# Patient Record
Sex: FEMALE | Race: WHITE | NOT HISPANIC OR LATINO | Employment: OTHER | URBAN - METROPOLITAN AREA
[De-identification: names, ages, dates, MRNs, and addresses within clinical notes are randomized per-mention and may not be internally consistent; named-entity substitution may affect disease eponyms.]

---

## 2020-04-15 RX ORDER — OMEGA-3/DHA/EPA/FISH OIL 300-1000MG
CAPSULE,DELAYED RELEASE (ENTERIC COATED) ORAL DAILY
COMMUNITY

## 2020-04-15 RX ORDER — NAPROXEN SODIUM 220 MG/1
81 TABLET, FILM COATED ORAL DAILY
COMMUNITY

## 2020-04-15 RX ORDER — DOXYCYCLINE 100 MG/1
100 CAPSULE ORAL 2 TIMES DAILY
COMMUNITY

## 2020-04-15 RX ORDER — LEVOTHYROXINE SODIUM 100 UG/1
100 TABLET ORAL
COMMUNITY

## 2020-04-15 RX ORDER — GABAPENTIN 100 MG/1
100 CAPSULE ORAL 3 TIMES DAILY
COMMUNITY
End: 2020-04-22

## 2020-04-15 RX ORDER — BUPROPION HYDROCHLORIDE 150 MG/1
150 TABLET ORAL DAILY
COMMUNITY
End: 2020-04-22

## 2020-04-22 ENCOUNTER — OFFICE VISIT (OUTPATIENT)
Dept: OBSTETRICS AND GYNECOLOGY | Facility: CLINIC | Age: 65
End: 2020-04-22
Payer: COMMERCIAL

## 2020-04-22 DIAGNOSIS — Z78.0 MENOPAUSE: Primary | ICD-10-CM

## 2020-04-22 PROCEDURE — 99213 PR OFFICE/OUTPT VISIT, EST, LEVL III, 20-29 MIN: ICD-10-PCS | Mod: 95,,, | Performed by: OBSTETRICS & GYNECOLOGY

## 2020-04-22 PROCEDURE — 99213 OFFICE O/P EST LOW 20 MIN: CPT | Mod: 95,,, | Performed by: OBSTETRICS & GYNECOLOGY

## 2020-04-22 NOTE — PROGRESS NOTES
Subjective:       Patient ID: Ambrocio Marino is a 64 y.o. female.    Chief Complaint:  Menopause      History of Present Illness  The patient location is: home  The chief complaint leading to consultation is: menopause  Visit type: audiovisual  Total time spent with patient: 10 min  Each patient to whom he or she provides medical services by telemedicine is:  (1) informed of the relationship between the physician and patient and the respective role of any other health care provider with respect to management of the patient; and (2) notified that he or she may decline to receive medical services by telemedicine and may withdraw from such care at any time.    Notes:     Hormone Replacement Counseling  Patient presents to discuss hormone replacement therapy. Patient is requesting hormone replacement therapy due to no energy. The patient is not currently taking hormone replacement therapy. Patient denies post-menopausal vaginal bleeding. The patient is sexually active. GYN screening history: last pap: was normal. Patient is hormone deficient due to hysterectomy with BSO, which occurred at age 51. The patient currently has symptoms of decreased libido. Symptoms in the past had included: no energy.    Current hormone therapy:   none    Previous hormone therapy:   Testosterone  Reason for starting HRT: no energy  Bleeding in past on HRT: none                        GYN & OB History  No LMP recorded.   Date of Last Pap: No result found    OB History    Para Term  AB Living   4 3           SAB TAB Ectopic Multiple Live Births                  # Outcome Date GA Lbr Narinder/2nd Weight Sex Delivery Anes PTL Lv   4             3 Para            2 Para            1 Para                Review of Systems  Review of Systems   Constitutional: Positive for fatigue.   Gastrointestinal: Negative for abdominal pain, bloating, blood in stool, constipation, diarrhea, nausea, vomiting, reflux and fecal incontinence.    Genitourinary: Positive for decreased libido. Negative for bladder incontinence, dysmenorrhea, dyspareunia, dysuria, flank pain, frequency, genital sores, hematuria, hot flashes, menorrhagia, menstrual problem, pelvic pain, urgency, vaginal bleeding, vaginal discharge, vaginal pain, urinary incontinence, postcoital bleeding, postmenopausal bleeding, vaginal dryness and vaginal odor.           Objective:    Physical Exam       Assessment:        1. Menopause       Menopause             Plan:      Follow up in about 1 year (around 4/22/2021).    will start test cream [facility]

## 2020-11-12 NOTE — TELEPHONE ENCOUNTER
Pharmacy faxed refill request on Testosterone Cream.   Faxed approval to pharmacy. Vonda Pappas      Testosterone Cream 3mg/ml  #30  Apply 1ml (4 clicks) topically daily  3 refills

## 2020-12-10 ENCOUNTER — TELEPHONE (OUTPATIENT)
Dept: OBSTETRICS AND GYNECOLOGY | Facility: CLINIC | Age: 65
End: 2020-12-10

## 2020-12-10 NOTE — TELEPHONE ENCOUNTER
----- Message from Lucy Chin sent at 12/10/2020  9:19 AM CST -----  Regarding: patient prescription  Contact: patient  Patient would like to check the status on her refill request faxed from [facility]. Patient stated they have been sending requests for the past month for her testosterone cream. Please call back at [phone]

## 2020-12-10 NOTE — TELEPHONE ENCOUNTER
Called and spoke with patient in regards to her refill. I adv the patient that we had faxed over her refill request on 11/12/20. I will go ahead and call in the medication. Pt verbalized understanding. Vonda Pappas      Testosterone Cream 3mg/ml  #30  Apply 1ml (4 clicks) topically daily  3 Refills

## 2024-12-02 ENCOUNTER — PATIENT MESSAGE (OUTPATIENT)
Dept: ADMINISTRATIVE | Facility: OTHER | Age: 69
End: 2024-12-02
Payer: COMMERCIAL